# Patient Record
Sex: FEMALE | Race: AMERICAN INDIAN OR ALASKA NATIVE
[De-identification: names, ages, dates, MRNs, and addresses within clinical notes are randomized per-mention and may not be internally consistent; named-entity substitution may affect disease eponyms.]

---

## 2021-12-07 ENCOUNTER — HOSPITAL ENCOUNTER (EMERGENCY)
Dept: HOSPITAL 5 - ED | Age: 56
Discharge: HOME | End: 2021-12-07
Payer: SELF-PAY

## 2021-12-07 VITALS — DIASTOLIC BLOOD PRESSURE: 98 MMHG | SYSTOLIC BLOOD PRESSURE: 152 MMHG

## 2021-12-07 DIAGNOSIS — R41.3: ICD-10-CM

## 2021-12-07 DIAGNOSIS — F10.20: Primary | ICD-10-CM
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
## 2021-12-07 LAB
ALBUMIN SERPL-MCNC: 4.2 G/DL (ref 3.9–5)
ALT SERPL-CCNC: 18 UNITS/L (ref 7–56)
APTT BLD: 26 SEC. (ref 24.2–36.6)
BACTERIA #/AREA URNS HPF: (no result) /HPF
BAND NEUTROPHILS # (MANUAL): 0 K/MM3
BILIRUB UR QL STRIP: (no result)
BLOOD UR QL VISUAL: (no result)
BUN SERPL-MCNC: 4 MG/DL (ref 7–17)
BUN/CREAT SERPL: 8 %
CALCIUM SERPL-MCNC: 9.5 MG/DL (ref 8.4–10.2)
HCT VFR BLD CALC: 48.8 % (ref 30.3–42.9)
HEMOLYSIS INDEX: 20
HGB BLD-MCNC: 15.4 GM/DL (ref 10.1–14.3)
INR PPP: 0.84 (ref 0.87–1.13)
MCHC RBC AUTO-ENTMCNC: 32 % (ref 30–34)
MCV RBC AUTO: 84 FL (ref 79–97)
MUCOUS THREADS #/AREA URNS HPF: (no result) /HPF
MYELOCYTES # (MANUAL): 0 K/MM3
PH UR STRIP: 5 [PH] (ref 5–7)
PLATELET # BLD: 301 K/MM3 (ref 140–440)
PROMYELOCYTES # (MANUAL): 0 K/MM3
PROT UR STRIP-MCNC: (no result) MG/DL
RBC # BLD AUTO: 5.81 M/MM3 (ref 3.65–5.03)
RBC #/AREA URNS HPF: 2 /HPF (ref 0–6)
TOTAL CELLS COUNTED BLD: 100
UROBILINOGEN UR-MCNC: < 2 MG/DL (ref ?–2)
WBC #/AREA URNS HPF: 107 /HPF (ref 0–6)

## 2021-12-07 PROCEDURE — 85610 PROTHROMBIN TIME: CPT

## 2021-12-07 PROCEDURE — 85007 BL SMEAR W/DIFF WBC COUNT: CPT

## 2021-12-07 PROCEDURE — 70450 CT HEAD/BRAIN W/O DYE: CPT

## 2021-12-07 PROCEDURE — 81001 URINALYSIS AUTO W/SCOPE: CPT

## 2021-12-07 PROCEDURE — 36415 COLL VENOUS BLD VENIPUNCTURE: CPT

## 2021-12-07 PROCEDURE — 85025 COMPLETE CBC W/AUTO DIFF WBC: CPT

## 2021-12-07 PROCEDURE — 85730 THROMBOPLASTIN TIME PARTIAL: CPT

## 2021-12-07 PROCEDURE — 84443 ASSAY THYROID STIM HORMONE: CPT

## 2021-12-07 PROCEDURE — G0480 DRUG TEST DEF 1-7 CLASSES: HCPCS

## 2021-12-07 PROCEDURE — 80307 DRUG TEST PRSMV CHEM ANLYZR: CPT

## 2021-12-07 PROCEDURE — 80320 DRUG SCREEN QUANTALCOHOLS: CPT

## 2021-12-07 PROCEDURE — 84484 ASSAY OF TROPONIN QUANT: CPT

## 2021-12-07 PROCEDURE — 99284 EMERGENCY DEPT VISIT MOD MDM: CPT

## 2021-12-07 PROCEDURE — 96365 THER/PROPH/DIAG IV INF INIT: CPT

## 2021-12-07 PROCEDURE — 96366 THER/PROPH/DIAG IV INF ADDON: CPT

## 2021-12-07 PROCEDURE — 93005 ELECTROCARDIOGRAM TRACING: CPT

## 2021-12-07 PROCEDURE — 82140 ASSAY OF AMMONIA: CPT

## 2021-12-07 PROCEDURE — 80053 COMPREHEN METABOLIC PANEL: CPT

## 2021-12-07 NOTE — CAT SCAN REPORT
CT HEAD WITHOUT CONTRAST



INDICATION / CLINICAL INFORMATION:

Altered mental status.



TECHNIQUE:

All CT scans at this location are performed using CT dose reduction for ALARA by means of automated e
xposure control. 



COMPARISON:

None available.



FINDINGS:

HEMORRHAGE: No evidence of intracranial hemorrhage or extra-axial fluid collection.

EXTRA-AXIAL SPACES: Cortical sulci, sylvian fissures and basilar cisterns have an unremarkable appear
ance.

VENTRICULAR SYSTEM: The third and lateral ventricles are of normal size and configuration.

CEREBRAL PARENCHYMA: Periventricular and deep white matter lucency are demonstrated in both cerebral 
hemispheres likely reflecting the presence of microvascular ischemic change. Are there factors includ
ing hypertension, diabetes or cigarettes smoking 

MIDLINE SHIFT OR HERNIATION: There is no mass effect.

CEREBELLUM / BRAINSTEM: Brainstem and cerebellum have an unremarkable appearance.

MIDLINE STRUCTURES:No abnormalities of the pituitary gland or pineal region are identified.

INTRACRANIAL VESSELS: Calcified atherosclerotic plaque is seen along the course of the cavernous segm
ents of both internal carotid arteries. This reflects presence of intercranial atherosclerotic diseas
e which is greater than expected for the patient's age of 56 years.

ORBITS: visualized portions of the orbits have an unremarkable appearance.

SOFT TISSUES of HEAD: No significant abnormality.

CALVARIUM: Evaluation of bone windows reveals no abnormalities.

PARANASAL SINUSES / MASTOID AIR CELLS: Visualized portions of the paranasal sinuses are free from inf
lammatory mucosal disease. Mastoid air cells are normally pneumatized.



ADDITIONAL FINDINGS: None.



IMPRESSION:

1. Moderate microvascular ischemic change and premature intercranial atherosclerotic disease as descr
ibed above.

2. No acute intracranial abnormalities are identified.



Signer Name: Hal Perez MD 

Signed: 12/7/2021 8:40 AM

Workstation Name: [id number]

## 2021-12-07 NOTE — EMERGENCY DEPARTMENT REPORT
HPI





- General


Chief Complaint: Altered Mental Status


Time Seen by Provider: 12/07/21 07:05





- HPI


HPI: 





56-year-old -American female presents to the emergency department for 

evaluation of some intermittent altered mental status and/or memory lapses.  

Some of the information obtained is from the patient's friend, who is at 

bedside.  Apparently the patient was admitted to [facility] about 8 months 

ago, but they cannot tell me exactly the reason as to why.  They say that the 

patient was discharged home with a diagnosis of UTI but the patient never filled

the prescription.  They say that they heard that sometimes when infections go 

untreated that it can cause "sepsis."  The patient has a history of daily 

alcohol use and what sounds like dependence.  No history of withdrawal as the 

patient has not attempted to stop drinking.  She says that the last time that 

she drank alcohol was last night, but the friend says that it was a "very large 

beer."  In triage the patient was AAO x2 to person and place only, but for my 

initial examination the patient is AAO x3.  However she does display some 

confusion.  Patient's friend says that she went to get her this morning to bring

her to the emergency department and the patient was convinced that she was 

wearing boots when in fact she was wearing socks and sandals.  The friend 

literally had to bring her back into the house and show her that the boots were 

still in her closet before she would understand or agree that she was not 

wearing the boot on her feet.  She does not have a primary care physician.  It 

has been a few years since she followed up with a PCP for regular or annual 

checkups.  She otherwise denies any past medical history.  Lately the patient 

has been having some insomnia.  She spends her time walking/pacing around and 

complains of bilateral foot pain to the bottom of her feet.  She denies any 

suicidal or homicidal ideations.  She denies any auditory or visual hallucinat

ions.





ED Past Medical Hx





- Past Medical History


Previous Medical History?: No





- Surgical History


Past Surgical History?: No





- Medications


Home Medications: 


                                Home Medications











 Medication  Instructions  Recorded  Confirmed  Last Taken  Type


 


Nitrofurantoin Mono/M-Cryst 100 mg PO Q12HR #14 capsule 12/07/21  Unknown Rx





[Macrobid CAP]     














ED Review of Systems


ROS: 


Stated complaint: AMS


Other details as noted in HPI





Comment: All other systems reviewed and negative


Constitutional: denies: chills, fever


Eyes: denies: eye pain, vision change


ENT: denies: ear pain, throat pain


Respiratory: denies: cough, shortness of breath


Cardiovascular: denies: chest pain, palpitations


Gastrointestinal: denies: abdominal pain, vomiting


Genitourinary: denies: dysuria, discharge


Musculoskeletal: denies: back pain, arthralgia


Skin: denies: rash, lesions


Neurological: confusion.  denies: headache, weakness





Physical Exam





- Physical Exam


Vital Signs: 


                                   Vital Signs











  12/07/21





  06:53


 


Temperature 97.8 F


 


Pulse Rate 116 H


 


Respiratory 16





Rate 


 


Blood Pressure 156/103





[Left] 


 


O2 Sat by Pulse 97





Oximetry 











Physical Exam: 





GENERAL: The patient is well-developed well-nourished.


HENT: Normocephalic.  Atraumatic.    Patient has moist mucous membranes.


EYES: Extraocular motions are intact.  No nystagmus.  


NECK: Supple. Trachea is midline.


CHEST/LUNGS: Clear to auscultation.  There is no respiratory distress noted.


HEART/CARDIOVASCULAR: Regular.  There is no tachycardia.  There is no murmur.


ABDOMEN: Abdomen is soft, nontender.  Patient has normal bowel sounds.  There is

no abdominal distention.


SKIN: Skin is warm and dry. 


NEURO: The patient is awake, alert, and cooperative.  The patient has no focal 

neurologic deficits.  Normal speech. Cranial nerves II through XII grossly 

intact.  No facial asymmetry. 


MUSCULOSKELETAL: There is no tenderness or deformity.  There is no limitation 

range of motion.  





ED Course


                                   Vital Signs











  12/07/21





  06:53


 


Temperature 97.8 F


 


Pulse Rate 116 H


 


Respiratory 16





Rate 


 


Blood Pressure 156/103





[Left] 


 


O2 Sat by Pulse 97





Oximetry 














ED Medical Decision Making





- Lab Data


Result diagrams: 


                                 12/07/21 07:32





                                 12/07/21 07:32





                                   Lab Results











  12/07/21 12/07/21 12/07/21 Range/Units





  07:32 07:32 07:32 


 


WBC  8.0    (4.5-11.0)  K/mm3


 


RBC  5.81 H    (3.65-5.03)  M/mm3


 


Hgb  15.4 H    (10.1-14.3)  gm/dl


 


Hct  48.8 H    (30.3-42.9)  %


 


MCV  84    (79-97)  fl


 


MCH  27 L    (28-32)  pg


 


MCHC  32    (30-34)  %


 


RDW  17.3 H    (13.2-15.2)  %


 


Plt Count  301    (140-440)  K/mm3


 


Lymph % (Auto)  Np    


 


Mono % (Auto)  Np    


 


Eos % (Auto)  Np    


 


Baso % (Auto)  Np    


 


Lymph # (Auto)  Np    


 


Mono # (Auto)  Np    


 


Eos # (Auto)  Np    


 


Baso # (Auto)  Np    


 


Add Manual Diff  Complete    


 


Total Counted  100    


 


Seg Neutrophils %  Np    


 


Seg Neuts % (Manual)  72.0 H    (40.0-70.0)  %


 


Lymphocytes % (Manual)  25.0    (13.4-35.0)  %


 


Monocytes % (Manual)  2.0    (0.0-7.3)  %


 


Eosinophils % (Manual)  1.0    (0.0-4.3)  %


 


Nucleated RBC %  Not Reportable    


 


Seg Neutrophils #  Np    


 


Seg Neutrophils # Man  5.8    (1.8-7.7)  K/mm3


 


Band Neutrophils #  0.0    K/mm3


 


Lymphocytes # (Manual)  2.0    (1.2-5.4)  K/mm3


 


Abs React Lymphs (Man)  0.0    K/mm3


 


Monocytes # (Manual)  0.2    (0.0-0.8)  K/mm3


 


Eosinophils # (Manual)  0.1    (0.0-0.4)  K/mm3


 


Basophils # (Manual)  0.0    (0.0-0.1)  K/mm3


 


Metamyelocytes #  0.0    K/mm3


 


Myelocytes #  0.0    K/mm3


 


Promyelocytes #  0.0    K/mm3


 


Blast Cells #  0.0    K/mm3


 


WBC Morphology  Not Reportable    


 


Hypersegmented Neuts  Not Reportable    


 


Hyposegmented Neuts  Not Reportable    


 


Hypogranular Neuts  Not Reportable    


 


Smudge Cells  Not Reportable    


 


Toxic Granulation  Not Reportable    


 


Toxic Vacuolation  Not Reportable    


 


Dohle Bodies  Not Reportable    


 


Pelger-Huet Anomaly  Not Reportable    


 


Edith Rods  Not Reportable    


 


Platelet Estimate  Consistent w auto    


 


Clumped Platelets  Not Reportable    


 


Plt Clumps, EDTA  Not Reportable    


 


Large Platelets  Not Reportable    


 


Giant Platelets  Not Reportable    


 


Platelet Satelliting  Not Reportable    


 


Plt Morphology Comment  Not Reportable    


 


RBC Morphology  Not Reportable    


 


Dimorphic RBCs  Not Reportable    


 


Polychromasia  Not Reportable    


 


Hypochromasia  Not Reportable    


 


Poikilocytosis  Not Reportable    


 


Anisocytosis  Few    


 


Microcytosis  Not Reportable    


 


Macrocytosis  Not Reportable    


 


Spherocytes  Not Reportable    


 


Pappenheimer Bodies  Not Reportable    


 


Sickle Cells  Not Reportable    


 


Target Cells  Rare    


 


Tear Drop Cells  Not Reportable    


 


Ovalocytes  Not Reportable    


 


Helmet Cells  Not Reportable    


 


Finley-Volcano Bodies  Not Reportable    


 


Cabot Rings  Not Reportable    


 


[place] Cells  Not Reportable    


 


Bite Cells  Not Reportable    


 


Crenated Cell  Not Reportable    


 


Elliptocytes  Not Reportable    


 


Acanthocytes (Spur)  Not Reportable    


 


Rouleaux  Not Reportable    


 


Hemoglobin C Crystals  Not Reportable    


 


Schistocytes  Not Reportable    


 


Malaria parasites  Not Reportable    


 


Mau Bodies  Not Reportable    


 


Hem Pathologist Commnt  No    


 


PT   12.5   (12.2-14.9)  Sec.


 


INR   0.84 L   (0.87-1.13)  


 


APTT   26.0   (24.2-36.6)  Sec.


 


Sodium    133 L  (137-145)  mmol/L


 


Potassium    3.7  (3.6-5.0)  mmol/L


 


Chloride    96.1 L  ()  mmol/L


 


Carbon Dioxide    17 L  (22-30)  mmol/L


 


Anion Gap    24  mmol/L


 


BUN    4 L  (7-17)  mg/dL


 


Creatinine    0.5 L  (0.6-1.2)  mg/dL


 


Estimated GFR    > 60  ml/min


 


BUN/Creatinine Ratio    8  %


 


Glucose    141 H  ()  mg/dL


 


Calcium    9.5  (8.4-10.2)  mg/dL


 


Total Bilirubin    1.00  (0.1-1.2)  mg/dL


 


AST    39  (5-40)  units/L


 


ALT    18  (7-56)  units/L


 


Alkaline Phosphatase    142 H  ()  units/L


 


Ammonia     (25-60)  umol/L


 


Troponin T    < 0.010  (0.00-0.029)  ng/mL


 


Total Protein    8.2  (6.3-8.2)  g/dL


 


Albumin    4.2  (3.9-5)  g/dL


 


Albumin/Globulin Ratio    1.1  %


 


TSH     (0.270-4.200)  mlU/mL


 


Urine Color     (Yellow)  


 


Urine Turbidity     (Clear)  


 


Urine pH     (5.0-7.0)  


 


Ur Specific Gravity     (1.003-1.030)  


 


Urine Protein     (Negative)  mg/dL


 


Urine Glucose (UA)     (Negative)  mg/dL


 


Urine Ketones     (Negative)  mg/dL


 


Urine Blood     (Negative)  


 


Urine Nitrite     (Negative)  


 


Urine Bilirubin     (Negative)  


 


Urine Urobilinogen     (<2.0)  mg/dL


 


Ur Leukocyte Esterase     (Negative)  


 


Urine WBC (Auto)     (0.0-6.0)  /HPF


 


Urine RBC (Auto)     (0.0-6.0)  /HPF


 


U Epithel Cells (Auto)     (0-13.0)  /HPF


 


Urine Bacteria (Auto)     (Negative)  /HPF


 


Urine Mucus     /HPF


 


Urine Opiates Screen     


 


Urine Methadone Screen     


 


Ur Barbiturates Screen     


 


Ur Phencyclidine Scrn     


 


Ur Amphetamines Screen     


 


U Benzodiazepines Scrn     


 


Urine Cocaine Screen     


 


U Marijuana (THC) Screen     


 


Drugs of Abuse Note     


 


Plasma/Serum Alcohol     (0-0.07)  %














  12/07/21 12/07/21 12/07/21 Range/Units





  07:32 07:32 10:09 


 


WBC     (4.5-11.0)  K/mm3


 


RBC     (3.65-5.03)  M/mm3


 


Hgb     (10.1-14.3)  gm/dl


 


Hct     (30.3-42.9)  %


 


MCV     (79-97)  fl


 


MCH     (28-32)  pg


 


MCHC     (30-34)  %


 


RDW     (13.2-15.2)  %


 


Plt Count     (140-440)  K/mm3


 


Lymph % (Auto)     


 


Mono % (Auto)     


 


Eos % (Auto)     


 


Baso % (Auto)     


 


Lymph # (Auto)     


 


Mono # (Auto)     


 


Eos # (Auto)     


 


Baso # (Auto)     


 


Add Manual Diff     


 


Total Counted     


 


Seg Neutrophils %     


 


Seg Neuts % (Manual)     (40.0-70.0)  %


 


Lymphocytes % (Manual)     (13.4-35.0)  %


 


Monocytes % (Manual)     (0.0-7.3)  %


 


Eosinophils % (Manual)     (0.0-4.3)  %


 


Nucleated RBC %     


 


Seg Neutrophils #     


 


Seg Neutrophils # Man     (1.8-7.7)  K/mm3


 


Band Neutrophils #     K/mm3


 


Lymphocytes # (Manual)     (1.2-5.4)  K/mm3


 


Abs React Lymphs (Man)     K/mm3


 


Monocytes # (Manual)     (0.0-0.8)  K/mm3


 


Eosinophils # (Manual)     (0.0-0.4)  K/mm3


 


Basophils # (Manual)     (0.0-0.1)  K/mm3


 


Metamyelocytes #     K/mm3


 


Myelocytes #     K/mm3


 


Promyelocytes #     K/mm3


 


Blast Cells #     K/mm3


 


WBC Morphology     


 


Hypersegmented Neuts     


 


Hyposegmented Neuts     


 


Hypogranular Neuts     


 


Smudge Cells     


 


Toxic Granulation     


 


Toxic Vacuolation     


 


Dohle Bodies     


 


Pelger-Huet Anomaly     


 


Edith Rods     


 


Platelet Estimate     


 


Clumped Platelets     


 


Plt Clumps, EDTA     


 


Large Platelets     


 


Giant Platelets     


 


Platelet Satelliting     


 


Plt Morphology Comment     


 


RBC Morphology     


 


Dimorphic RBCs     


 


Polychromasia     


 


Hypochromasia     


 


Poikilocytosis     


 


Anisocytosis     


 


Microcytosis     


 


Macrocytosis     


 


Spherocytes     


 


Pappenheimer Bodies     


 


Sickle Cells     


 


Target Cells     


 


Tear Drop Cells     


 


Ovalocytes     


 


Helmet Cells     


 


Finley-Volcano Bodies     


 


Cabot Rings     


 


[place] Cells     


 


Bite Cells     


 


Crenated Cell     


 


Elliptocytes     


 


Acanthocytes (Spur)     


 


Rouleaux     


 


Hemoglobin C Crystals     


 


Schistocytes     


 


Malaria parasites     


 


Mau Bodies     


 


Hem Pathologist Commnt     


 


PT     (12.2-14.9)  Sec.


 


INR     (0.87-1.13)  


 


APTT     (24.2-36.6)  Sec.


 


Sodium     (137-145)  mmol/L


 


Potassium     (3.6-5.0)  mmol/L


 


Chloride     ()  mmol/L


 


Carbon Dioxide     (22-30)  mmol/L


 


Anion Gap     mmol/L


 


BUN     (7-17)  mg/dL


 


Creatinine     (0.6-1.2)  mg/dL


 


Estimated GFR     ml/min


 


BUN/Creatinine Ratio     %


 


Glucose     ()  mg/dL


 


Calcium     (8.4-10.2)  mg/dL


 


Total Bilirubin     (0.1-1.2)  mg/dL


 


AST     (5-40)  units/L


 


ALT     (7-56)  units/L


 


Alkaline Phosphatase     ()  units/L


 


Ammonia    23.0 L  (25-60)  umol/L


 


Troponin T     (0.00-0.029)  ng/mL


 


Total Protein     (6.3-8.2)  g/dL


 


Albumin     (3.9-5)  g/dL


 


Albumin/Globulin Ratio     %


 


TSH  2.790    (0.270-4.200)  mlU/mL


 


Urine Color     (Yellow)  


 


Urine Turbidity     (Clear)  


 


Urine pH     (5.0-7.0)  


 


Ur Specific Gravity     (1.003-1.030)  


 


Urine Protein     (Negative)  mg/dL


 


Urine Glucose (UA)     (Negative)  mg/dL


 


Urine Ketones     (Negative)  mg/dL


 


Urine Blood     (Negative)  


 


Urine Nitrite     (Negative)  


 


Urine Bilirubin     (Negative)  


 


Urine Urobilinogen     (<2.0)  mg/dL


 


Ur Leukocyte Esterase     (Negative)  


 


Urine WBC (Auto)     (0.0-6.0)  /HPF


 


Urine RBC (Auto)     (0.0-6.0)  /HPF


 


U Epithel Cells (Auto)     (0-13.0)  /HPF


 


Urine Bacteria (Auto)     (Negative)  /HPF


 


Urine Mucus     /HPF


 


Urine Opiates Screen     


 


Urine Methadone Screen     


 


Ur Barbiturates Screen     


 


Ur Phencyclidine Scrn     


 


Ur Amphetamines Screen     


 


U Benzodiazepines Scrn     


 


Urine Cocaine Screen     


 


U Marijuana (THC) Screen     


 


Drugs of Abuse Note     


 


Plasma/Serum Alcohol   < 0.01   (0-0.07)  %














  12/07/21 12/07/21 Range/Units





  Unknown Unknown 


 


WBC    (4.5-11.0)  K/mm3


 


RBC    (3.65-5.03)  M/mm3


 


Hgb    (10.1-14.3)  gm/dl


 


Hct    (30.3-42.9)  %


 


MCV    (79-97)  fl


 


MCH    (28-32)  pg


 


MCHC    (30-34)  %


 


RDW    (13.2-15.2)  %


 


Plt Count    (140-440)  K/mm3


 


Lymph % (Auto)    


 


Mono % (Auto)    


 


Eos % (Auto)    


 


Baso % (Auto)    


 


Lymph # (Auto)    


 


Mono # (Auto)    


 


Eos # (Auto)    


 


Baso # (Auto)    


 


Add Manual Diff    


 


Total Counted    


 


Seg Neutrophils %    


 


Seg Neuts % (Manual)    (40.0-70.0)  %


 


Lymphocytes % (Manual)    (13.4-35.0)  %


 


Monocytes % (Manual)    (0.0-7.3)  %


 


Eosinophils % (Manual)    (0.0-4.3)  %


 


Nucleated RBC %    


 


Seg Neutrophils #    


 


Seg Neutrophils # Man    (1.8-7.7)  K/mm3


 


Band Neutrophils #    K/mm3


 


Lymphocytes # (Manual)    (1.2-5.4)  K/mm3


 


Abs React Lymphs (Man)    K/mm3


 


Monocytes # (Manual)    (0.0-0.8)  K/mm3


 


Eosinophils # (Manual)    (0.0-0.4)  K/mm3


 


Basophils # (Manual)    (0.0-0.1)  K/mm3


 


Metamyelocytes #    K/mm3


 


Myelocytes #    K/mm3


 


Promyelocytes #    K/mm3


 


Blast Cells #    K/mm3


 


WBC Morphology    


 


Hypersegmented Neuts    


 


Hyposegmented Neuts    


 


Hypogranular Neuts    


 


Smudge Cells    


 


Toxic Granulation    


 


Toxic Vacuolation    


 


Dohle Bodies    


 


Pelger-Huet Anomaly    


 


Edith Rods    


 


Platelet Estimate    


 


Clumped Platelets    


 


Plt Clumps, EDTA    


 


Large Platelets    


 


Giant Platelets    


 


Platelet Satelliting    


 


Plt Morphology Comment    


 


RBC Morphology    


 


Dimorphic RBCs    


 


Polychromasia    


 


Hypochromasia    


 


Poikilocytosis    


 


Anisocytosis    


 


Microcytosis    


 


Macrocytosis    


 


Spherocytes    


 


Pappenheimer Bodies    


 


Sickle Cells    


 


Target Cells    


 


Tear Drop Cells    


 


Ovalocytes    


 


Helmet Cells    


 


Finley-Volcano Bodies    


 


Cabot Rings    


 


Cynthia Cells    


 


Bite Cells    


 


Crenated Cell    


 


Elliptocytes    


 


Acanthocytes (Spur)    


 


Rouleaux    


 


Hemoglobin C Crystals    


 


Schistocytes    


 


Malaria parasites    


 


Mau Bodies    


 


Hem Pathologist Commnt    


 


PT    (12.2-14.9)  Sec.


 


INR    (0.87-1.13)  


 


APTT    (24.2-36.6)  Sec.


 


Sodium    (137-145)  mmol/L


 


Potassium    (3.6-5.0)  mmol/L


 


Chloride    ()  mmol/L


 


Carbon Dioxide    (22-30)  mmol/L


 


Anion Gap    mmol/L


 


BUN    (7-17)  mg/dL


 


Creatinine    (0.6-1.2)  mg/dL


 


Estimated GFR    ml/min


 


BUN/Creatinine Ratio    %


 


Glucose    ()  mg/dL


 


Calcium    (8.4-10.2)  mg/dL


 


Total Bilirubin    (0.1-1.2)  mg/dL


 


AST    (5-40)  units/L


 


ALT    (7-56)  units/L


 


Alkaline Phosphatase    ()  units/L


 


Ammonia    (25-60)  umol/L


 


Troponin T    (0.00-0.029)  ng/mL


 


Total Protein    (6.3-8.2)  g/dL


 


Albumin    (3.9-5)  g/dL


 


Albumin/Globulin Ratio    %


 


TSH    (0.270-4.200)  mlU/mL


 


Urine Color  Yellow   (Yellow)  


 


Urine Turbidity  Cloudy   (Clear)  


 


Urine pH  5.0   (5.0-7.0)  


 


Ur Specific Gravity  1.005   (1.003-1.030)  


 


Urine Protein  <15 mg/dl   (Negative)  mg/dL


 


Urine Glucose (UA)  Neg   (Negative)  mg/dL


 


Urine Ketones  Neg   (Negative)  mg/dL


 


Urine Blood  Sm   (Negative)  


 


Urine Nitrite  Pos   (Negative)  


 


Urine Bilirubin  Neg   (Negative)  


 


Urine Urobilinogen  < 2.0   (<2.0)  mg/dL


 


Ur Leukocyte Esterase  Lg   (Negative)  


 


Urine WBC (Auto)  107.0 H   (0.0-6.0)  /HPF


 


Urine RBC (Auto)  2.0   (0.0-6.0)  /HPF


 


U Epithel Cells (Auto)  9.0   (0-13.0)  /HPF


 


Urine Bacteria (Auto)  1+   (Negative)  /HPF


 


Urine Mucus  1+   /HPF


 


Urine Opiates Screen   Negative  


 


Urine Methadone Screen   Negative  


 


Ur Barbiturates Screen   Negative  


 


Ur Phencyclidine Scrn   Negative  


 


Ur Amphetamines Screen   Negative  


 


U Benzodiazepines Scrn   Negative  


 


Urine Cocaine Screen   Negative  


 


U Marijuana (THC) Screen   Negative  


 


Drugs of Abuse Note   Disclamer  


 


Plasma/Serum Alcohol    (0-0.07)  %














- EKG Data


-: EKG Interpreted by Me


EKG shows normal: sinus rhythm, axis, intervals, QRS complexes, ST-T waves


Rate: normal





- EKG Data


When compared to previous EKG there are: previous EKG unavailable


Interpretation: normal EKG





- Radiology Data


Radiology results: report reviewed





CT HEAD WITHOUT CONTRAST  INDICATION / CLINICAL INFORMATION: Altered mental 

status.  TECHNIQUE: All CT scans at this location are performed using CT dose 

reduction for ALARA by means of automated exposure control.  COMPARISON: None 

available.  FINDINGS: HEMORRHAGE: No evidence of intracranial hemorrhage or 

extra-axial fluid collection. EXTRA-AXIAL SPACES: Cortical sulci, sylvian 

fissures and basilar cisterns have an unremarkable appearance. VENTRICULAR 

SYSTEM: The third and lateral ventricles are of normal size and configuration. 

CEREBRAL PARENCHYMA: Periventricular and deep white matter lucency are 

demonstrated in both cerebral hemispheres likely reflecting the presence of 

microvascular ischemic change. Are there factors including hypertension, 

diabetes or cigarettes smoking MIDLINE SHIFT OR HERNIATION: There is no mass 

effect. CEREBELLUM / BRAINSTEM: Brainstem and cerebellum have an unremarkable 

appearance. MIDLINE STRUCTURES:No abnormalities of the pituitary gland or pineal

 region are identified. INTRACRANIAL VESSELS: Calcified atherosclerotic plaque 

is seen along the course of the cavernous segments of both internal carotid 

arteries. This reflects presence of intercranial atherosclerotic disease which 

is greater than expected for the patient's age of 56 years. ORBITS: visualized 

portions of the orbits have an unremarkable appearance. SOFT TISSUES of HEAD: No

 significant abnormality. CALVARIUM: Evaluation of bone windows reveals no 

abnormalities. PARANASAL SINUSES / MASTOID AIR CELLS: Visualized portions of the

 paranasal sinuses are free from inflammatory mucosal disease. Mastoid air cells

 are normally pneumatized.  ADDITIONAL FINDINGS: None.  IMPRESSION: 1. Moderate 

microvascular ischemic change and premature intercranial atherosclerotic disease

 as described above. 2. No acute intracranial abnormalities are identified.





- Medical Decision Making





This patient was brought in by her friend for evaluation of altered mental 

status versus memory lapses and a history of daily alcohol use if not 

dependence.  Apparently, through triage, the patient was AAO x1, but at the time

 of my examination the patient is AAO x3.  Throughout her ED course I do see a 

few episodes in which the patient gets mixed up or forget something.  Otherwise,

 on examination, the patient does not have any focal, motor or sensory deficits 

and her cranial nerves are intact.  CT scan of the head without contrast does 

not show any hemorrhage, large vessel occlusion, or any other acute process.  

However radiology does read the CT results showing moderate microvascular 

ischemic changes and premature atherosclerosis.  The symptoms of intermittent 

confusion or memory lapses have been going on for about 6 to 8 months.  Overall 

this appears consistent with some type of mild neurocognitive disorder and may 

be related to her daily alcohol use, but the patient may also have some early 

onset dementia.  





Labs have been mostly unremarkable including CBC, metabolic panel, blood alcohol

 level, UDS, normal thyroid function, ammonia.  Urinalysis does show a urinary 

tract infection.  Patient will be started on Macrobid.





Patient does not appear to have any acute neurological changes or any other 

medical conditions that require admission at this time.  She has been given a 

referral for primary care and understand that she may need to see a neurologist 

in the near future.  I also had a very long discussion with the patient regardin

g cessation of alcohol use and seeking either outpatient or inpatient alcohol 

detox/rehabilitation.


Critical Care Time: No


Critical care attestation.: 


If time is entered above; I have spent that time in minutes in the direct care 

of this critically ill patient, excluding procedure time.








ED Disposition


Clinical Impression: 


 Daily consumption of alcohol, Memory changes





UTI (urinary tract infection)


Qualifiers:


 Urinary tract infection type: acute cystitis Hematuria presence: without 

hematuria Qualified Code(s): N30.00 - Acute cystitis without hematuria





Disposition: 01 HOME / SELF CARE / HOMELESS


Is pt being admited?: No


Condition: Stable


Instructions:  Alcohol Use Disorder, Urinary Tract Infection, Adult, Mild 

Neurocognitive Disorder


Additional Instructions: 


Please follow-up with a primary care physician in the next few days.  I have 

given you a referral for a local primary care physician, Dr. Zacarias, and a 

primary care clinic, [facility].





Please follow-up with a alcohol abuse/dependence facility for 

detox/rehabilitation.





Take the antibiotics as prescribed.





Return to the emergency department with any worsening of your symptoms, new or 

concerning symptoms not addressed during this current emergency department 

visit, or with any acute distress.





To whom it may concern, this patient is medically cleared for alcohol/substance 

abuse treatment.


Prescriptions: 


Nitrofurantoin Mono/M-Cryst [Macrobid CAP] 100 mg PO Q12HR #14 capsule


Referrals: 


[facility] [Other] - 3-5 Days


PJ ZACARIAS MD [Staff Physician] - 3-5 Days


[facility] [Provider Group] - 3-5 Days


Time of Disposition: 13:05

## 2021-12-09 NOTE — ELECTROCARDIOGRAPH REPORT
[facility]

                                       

Test Date:    2021               Test Time:    08:10:20

Pat Name:     SUGAR GRANDE         Department:   

Patient ID:   [id number]          Room:          

Gender:       F                        Technician:   MONIQUE

:          1965               Requested By: BALBINA PONCE

Order Number: [id number]              Reading MD:   Carina Gandara

                                 Measurements

Intervals                              Axis          

Rate:         98                       P:            58

GA:           135                      QRS:          35

QRSD:         66                       T:            30

QT:           369                                    

QTc:          470                                    

                           Interpretive Statements

Sinus rhythm

No previous ECG available for comparison

Electronically Signed On 2021 10:12:46 EST by Carina Gandara